# Patient Record
Sex: FEMALE | Race: WHITE | NOT HISPANIC OR LATINO
[De-identification: names, ages, dates, MRNs, and addresses within clinical notes are randomized per-mention and may not be internally consistent; named-entity substitution may affect disease eponyms.]

---

## 2020-09-04 ENCOUNTER — NON-APPOINTMENT (OUTPATIENT)
Age: 60
End: 2020-09-04

## 2020-09-04 ENCOUNTER — APPOINTMENT (OUTPATIENT)
Dept: OPHTHALMOLOGY | Facility: CLINIC | Age: 60
End: 2020-09-04
Payer: COMMERCIAL

## 2020-09-04 PROCEDURE — 92002 INTRM OPH EXAM NEW PATIENT: CPT

## 2020-09-04 PROCEDURE — 92015 DETERMINE REFRACTIVE STATE: CPT

## 2020-09-04 PROCEDURE — 92132 CPTRZD OPH DX IMG ANT SGM: CPT

## 2020-09-21 ENCOUNTER — APPOINTMENT (OUTPATIENT)
Dept: OPHTHALMOLOGY | Facility: CLINIC | Age: 60
End: 2020-09-21
Payer: COMMERCIAL

## 2020-09-21 ENCOUNTER — NON-APPOINTMENT (OUTPATIENT)
Age: 60
End: 2020-09-21

## 2020-09-21 PROCEDURE — 92002 INTRM OPH EXAM NEW PATIENT: CPT

## 2020-09-21 PROCEDURE — 92020 GONIOSCOPY: CPT

## 2020-09-21 PROCEDURE — 76513 OPH US DX ANT SGM US UNI/BI: CPT | Mod: RT

## 2020-11-03 ENCOUNTER — NON-APPOINTMENT (OUTPATIENT)
Age: 60
End: 2020-11-03

## 2020-11-03 ENCOUNTER — APPOINTMENT (OUTPATIENT)
Dept: OPHTHALMOLOGY | Facility: CLINIC | Age: 60
End: 2020-11-03
Payer: COMMERCIAL

## 2020-11-03 PROCEDURE — 99072 ADDL SUPL MATRL&STAF TM PHE: CPT

## 2020-11-03 PROCEDURE — 66761 REVISION OF IRIS: CPT | Mod: RT

## 2020-11-13 ENCOUNTER — APPOINTMENT (OUTPATIENT)
Dept: OPHTHALMOLOGY | Facility: CLINIC | Age: 60
End: 2020-11-13

## 2020-12-03 ENCOUNTER — APPOINTMENT (OUTPATIENT)
Dept: OPHTHALMOLOGY | Facility: CLINIC | Age: 60
End: 2020-12-03

## 2021-05-05 ENCOUNTER — APPOINTMENT (OUTPATIENT)
Dept: ORTHOPEDIC SURGERY | Facility: CLINIC | Age: 61
End: 2021-05-05
Payer: MEDICAID

## 2021-05-05 VITALS — WEIGHT: 129 LBS | BODY MASS INDEX: 20.73 KG/M2 | RESPIRATION RATE: 16 BRPM | HEIGHT: 66 IN

## 2021-05-05 DIAGNOSIS — Z82.62 FAMILY HISTORY OF OSTEOPOROSIS: ICD-10-CM

## 2021-05-05 DIAGNOSIS — Z78.9 OTHER SPECIFIED HEALTH STATUS: ICD-10-CM

## 2021-05-05 DIAGNOSIS — Z86.39 PERSONAL HISTORY OF OTHER ENDOCRINE, NUTRITIONAL AND METABOLIC DISEASE: ICD-10-CM

## 2021-05-05 DIAGNOSIS — Z82.61 FAMILY HISTORY OF ARTHRITIS: ICD-10-CM

## 2021-05-05 PROCEDURE — 99072 ADDL SUPL MATRL&STAF TM PHE: CPT

## 2021-05-05 PROCEDURE — 99204 OFFICE O/P NEW MOD 45 MIN: CPT

## 2021-05-05 PROCEDURE — 73110 X-RAY EXAM OF WRIST: CPT | Mod: LT

## 2021-05-05 RX ORDER — VENLAFAXINE HYDROCHLORIDE 150 MG/1
CAPSULE, EXTENDED RELEASE ORAL
Refills: 0 | Status: ACTIVE | COMMUNITY

## 2021-05-05 RX ORDER — LEVOTHYROXINE SODIUM 137 UG/1
TABLET ORAL
Refills: 0 | Status: ACTIVE | COMMUNITY

## 2021-05-05 RX ORDER — ATORVASTATIN CALCIUM 80 MG/1
TABLET, FILM COATED ORAL
Refills: 0 | Status: ACTIVE | COMMUNITY

## 2021-05-06 ENCOUNTER — APPOINTMENT (OUTPATIENT)
Dept: ORTHOPEDIC SURGERY | Facility: CLINIC | Age: 61
End: 2021-05-06
Payer: MEDICAID

## 2021-05-06 DIAGNOSIS — S69.92XA UNSPECIFIED INJURY OF LEFT WRIST, HAND AND FINGER(S), INITIAL ENCOUNTER: ICD-10-CM

## 2021-05-06 PROCEDURE — 99214 OFFICE O/P EST MOD 30 MIN: CPT

## 2021-05-06 PROCEDURE — 99072 ADDL SUPL MATRL&STAF TM PHE: CPT

## 2021-05-09 ENCOUNTER — TRANSCRIPTION ENCOUNTER (OUTPATIENT)
Age: 61
End: 2021-05-09

## 2021-05-10 ENCOUNTER — APPOINTMENT (OUTPATIENT)
Dept: ORTHOPEDIC SURGERY | Facility: AMBULATORY SURGERY CENTER | Age: 61
End: 2021-05-10

## 2021-05-10 ENCOUNTER — OUTPATIENT (OUTPATIENT)
Dept: OUTPATIENT SERVICES | Facility: HOSPITAL | Age: 61
LOS: 1 days | Discharge: HOME | End: 2021-05-10
Payer: MEDICAID

## 2021-05-10 PROCEDURE — 25290 INCISE WRIST/FOREARM TENDON: CPT | Mod: LT

## 2021-05-10 PROCEDURE — 25607 OPTX DST RD XARTC FX/EPI SEP: CPT | Mod: LT

## 2021-05-10 RX ORDER — HYDROCODONE BITARTRATE AND ACETAMINOPHEN 5; 325 MG/1; MG/1
5-325 TABLET ORAL
Qty: 20 | Refills: 0 | Status: ACTIVE | COMMUNITY
Start: 2021-05-10 | End: 1900-01-01

## 2021-05-19 ENCOUNTER — APPOINTMENT (OUTPATIENT)
Dept: ORTHOPEDIC SURGERY | Facility: CLINIC | Age: 61
End: 2021-05-19
Payer: MEDICAID

## 2021-05-19 PROCEDURE — 99024 POSTOP FOLLOW-UP VISIT: CPT

## 2021-05-19 PROCEDURE — 73110 X-RAY EXAM OF WRIST: CPT | Mod: LT

## 2021-05-25 ENCOUNTER — APPOINTMENT (OUTPATIENT)
Dept: ORTHOPEDIC SURGERY | Facility: CLINIC | Age: 61
End: 2021-05-25

## 2021-06-09 ENCOUNTER — APPOINTMENT (OUTPATIENT)
Dept: ORTHOPEDIC SURGERY | Facility: CLINIC | Age: 61
End: 2021-06-09
Payer: MEDICAID

## 2021-06-09 DIAGNOSIS — S52.502A UNSPECIFIED FRACTURE OF THE LOWER END OF LEFT RADIUS, INITIAL ENCOUNTER FOR CLOSED FRACTURE: ICD-10-CM

## 2021-06-09 PROCEDURE — 73110 X-RAY EXAM OF WRIST: CPT | Mod: LT

## 2021-06-09 PROCEDURE — 99024 POSTOP FOLLOW-UP VISIT: CPT

## 2021-08-25 ENCOUNTER — APPOINTMENT (OUTPATIENT)
Dept: ORTHOPEDIC SURGERY | Facility: CLINIC | Age: 61
End: 2021-08-25

## 2021-09-20 ENCOUNTER — NON-APPOINTMENT (OUTPATIENT)
Age: 61
End: 2021-09-20

## 2021-09-21 ENCOUNTER — APPOINTMENT (OUTPATIENT)
Dept: ORTHOPEDIC SURGERY | Facility: CLINIC | Age: 61
End: 2021-09-21

## 2022-10-19 ENCOUNTER — NON-APPOINTMENT (OUTPATIENT)
Age: 62
End: 2022-10-19

## 2022-10-21 ENCOUNTER — NON-APPOINTMENT (OUTPATIENT)
Age: 62
End: 2022-10-21

## 2022-10-27 ENCOUNTER — APPOINTMENT (OUTPATIENT)
Dept: UROLOGY | Facility: CLINIC | Age: 62
End: 2022-10-27

## 2022-10-27 VITALS
OXYGEN SATURATION: 96 % | DIASTOLIC BLOOD PRESSURE: 87 MMHG | TEMPERATURE: 97.9 F | HEART RATE: 71 BPM | SYSTOLIC BLOOD PRESSURE: 144 MMHG

## 2022-10-27 DIAGNOSIS — Z00.00 ENCOUNTER FOR GENERAL ADULT MEDICAL EXAMINATION W/OUT ABNORMAL FINDINGS: ICD-10-CM

## 2022-10-27 DIAGNOSIS — Z87.440 PERSONAL HISTORY OF URINARY (TRACT) INFECTIONS: ICD-10-CM

## 2022-10-27 PROCEDURE — 99205 OFFICE O/P NEW HI 60 MIN: CPT

## 2022-10-27 NOTE — PHYSICAL EXAM
[General Appearance - Well Developed] : well developed [General Appearance - Well Nourished] : well nourished [Normal Appearance] : normal appearance [Well Groomed] : well groomed [General Appearance - In No Acute Distress] : no acute distress [Edema] : no peripheral edema [Respiration, Rhythm And Depth] : normal respiratory rhythm and effort [Exaggerated Use Of Accessory Muscles For Inspiration] : no accessory muscle use [Abdomen Soft] : soft [Abdomen Tenderness] : non-tender [Abdomen Mass (___ Cm)] : no abdominal mass palpated [Abdomen Hernia] : no hernia was discovered [External Female Genitalia] : normal external genitalia [Normal Station and Gait] : the gait and station were normal for the patient's age [] : no rash [No Focal Deficits] : no focal deficits [Oriented To Time, Place, And Person] : oriented to person, place, and time [Affect] : the affect was normal [Mood] : the mood was normal [Not Anxious] : not anxious [FreeTextEntry1] : chaperone TRISH Francisco present for exam.  Attempted to palpitate urethera, pain with palpitation.  exam stopped

## 2022-10-27 NOTE — HISTORY OF PRESENT ILLNESS
[FreeTextEntry1] : Dr. Sohan Vicente\par [address] \par [address]\par [place], [place] \par \par CC: UTI and Urinary Urgency\par \par This is a 62 year old female who presents today for UTI symptoms\par Reports 2-3 UTIs per year.  When she gets them they are " extremely painful" cant stand, sever lower abdominal pressure.\par No fevers, nausea/vomiting, no dysuria, no \par Nocturia x 3 \par \par Currently on antibiotics- Was on Cephalexin since Saturday.  Finished yesterday \par Constant urgency, frequency, no UUI\par No dysuria\par No gross hematuria \par \par Negative UCx 10/22/22, 10/20/22, , 1/2021, 9/2020, 7/2019\par Last UA on 9/21/21 showed 4 RBC/HPF\par \par 9/2021-10,000-49,000 Enterococcus \par \par Seen by prior urologist 4-5 years ago \par Completed Renal US and cystoscopy\par Was told she had cyst on her kidneys \par \par Some GI problems reports diarrhea and constipation at times\par Urinary symptoms worsen when she has bouts of diarrhea\par \par No vaginal deliveries \par \par Pain with intercourse over the past 4 years \par \par Fluids Intake:\par Snapple Ice Tea\par \par No pads\par NO RAISA, NO UUI \par \par \par \par Surgical Hx: wrist surgery, laparoscopy 26 years ago \par Social Hx: nonsmoker,  \par Family Hx: noncontributory \par \par \par \par

## 2022-10-27 NOTE — ASSESSMENT
[FreeTextEntry1] : Diagnosis: OAB, Pelvic Floor dysfunction, history of UTI \par \par Plan:\par check UA, cytology, consider CTU if hematuria \par \par Negative UCx x 2 on 10/20 and 10/22.  She just finished cephlaxin would not recommend UCx at this time\par Trial of Estrace Cream\par Trial of Myrbetriq for OAB - patient with memory decline and occasional loss for words does not want anticholinergic to due associations with cognitive decline/dementia \par \par Follow up for continued care with Dr. Avina\par \par Raul Gunn MD, FACS, FRCS \par  of Urology [facility]\par Director of Laparoscopic and Robotic Surgery \par [facility] Director of Urology, [facility] \par Professor of Urology\par \par (Office) [phone]\par (Cell)  [phone] \par [email or web address] \par \par

## 2022-10-31 ENCOUNTER — RX RENEWAL (OUTPATIENT)
Age: 62
End: 2022-10-31

## 2022-10-31 LAB
APPEARANCE: CLEAR
BACTERIA UR CULT: NORMAL
BACTERIA: NEGATIVE
BILIRUBIN URINE: NEGATIVE
BLOOD URINE: ABNORMAL
COLOR: NORMAL
GLUCOSE QUALITATIVE U: NEGATIVE
HYALINE CASTS: 0 /LPF
KETONES URINE: NEGATIVE
LEUKOCYTE ESTERASE URINE: NEGATIVE
MICROSCOPIC-UA: NORMAL
NITRITE URINE: NEGATIVE
PH URINE: 6
PROTEIN URINE: NEGATIVE
RED BLOOD CELLS URINE: 6 /HPF
SPECIFIC GRAVITY URINE: 1.01
SQUAMOUS EPITHELIAL CELLS: 0 /HPF
UROBILINOGEN URINE: NORMAL
WHITE BLOOD CELLS URINE: 0 /HPF

## 2022-11-02 LAB — URINE CYTOLOGY: NORMAL

## 2022-11-04 RX ORDER — MIRABEGRON 25 MG/1
25 TABLET, FILM COATED, EXTENDED RELEASE ORAL
Qty: 30 | Refills: 0 | Status: ACTIVE | COMMUNITY
Start: 2022-10-27 | End: 1900-01-01

## 2022-11-30 ENCOUNTER — APPOINTMENT (OUTPATIENT)
Dept: UROLOGY | Facility: CLINIC | Age: 62
End: 2022-11-30

## 2022-11-30 VITALS
DIASTOLIC BLOOD PRESSURE: 93 MMHG | BODY MASS INDEX: 199.5 KG/M2 | OXYGEN SATURATION: 96 % | TEMPERATURE: 97.4 F | SYSTOLIC BLOOD PRESSURE: 159 MMHG | HEART RATE: 86 BPM | WEIGHT: 293 LBS

## 2022-11-30 PROCEDURE — 99215 OFFICE O/P EST HI 40 MIN: CPT

## 2022-11-30 NOTE — ASSESSMENT
[FreeTextEntry1] : \par \par Impression/plan: 59-year-old woman with OAB and microhematuria.\par \par 1. Options for management of the patient's overactive bladder and incontinence discussed at length. These included medical therapy, behavioral modification, bladder retraining, and surgical options. Will try mirabegron 25 mg daily, se discussed, check BP. \par 2. CT Urogram and cystoscopy. \par

## 2022-11-30 NOTE — LETTER BODY
[Dear  ___] : Dear  [unfilled], [Consult Letter:] : I had the pleasure of evaluating your patient, [unfilled]. [Please see my note below.] : Please see my note below. [Consult Closing:] : Thank you very much for allowing me to participate in the care of this patient.  If you have any questions, please do not hesitate to contact me. [Sincerely,] : Sincerely, [FreeTextEntry3] : Starla Avina MD\par System Director [facility]\par Department of Urology\par [facility] \par   at [facility]\par  of Urology\par [facility] at [facility]\par

## 2022-11-30 NOTE — HISTORY OF PRESENT ILLNESS
[FreeTextEntry1] : 62-year-old woman referred by Dr. Gunn for urinary frequency, urinary urgency, and nocturia x2-3.  She had had a history in the past of recurrent urinary tract infections, her most recent urine culture was negative.  Her urinalysis did show 5 red blood cells.  Urine cytology was negative.  When she was seen last approximately month ago she was given a prescription for mirabegron, however she did not take up until now because she wanted my opinion on whether this was to be helpful.  She was also having a history in the past of severe pelvic pressure which is now since resolved.  She denies any other irritative obstructive voiding symptoms.  She denies any prolapse sensation.

## 2022-12-05 RX ORDER — CIPROFLOXACIN HYDROCHLORIDE 500 MG/1
500 TABLET, FILM COATED ORAL
Qty: 6 | Refills: 0 | Status: ACTIVE | COMMUNITY
Start: 2022-12-05 | End: 1900-01-01

## 2022-12-21 LAB — BACTERIA UR CULT: ABNORMAL

## 2023-01-06 ENCOUNTER — APPOINTMENT (OUTPATIENT)
Dept: UROLOGY | Facility: CLINIC | Age: 63
End: 2023-01-06
Payer: MEDICAID

## 2023-01-06 VITALS
OXYGEN SATURATION: 97 % | DIASTOLIC BLOOD PRESSURE: 87 MMHG | TEMPERATURE: 98.3 F | SYSTOLIC BLOOD PRESSURE: 136 MMHG | HEART RATE: 70 BPM

## 2023-01-06 DIAGNOSIS — R31.29 OTHER MICROSCOPIC HEMATURIA: ICD-10-CM

## 2023-01-06 DIAGNOSIS — R91.1 SOLITARY PULMONARY NODULE: ICD-10-CM

## 2023-01-06 LAB
BILIRUB UR QL STRIP: NORMAL
CLARITY UR: CLEAR
COLLECTION METHOD: NORMAL
GLUCOSE UR-MCNC: NORMAL
HCG UR QL: 0.2 EU/DL
HGB UR QL STRIP.AUTO: NORMAL
KETONES UR-MCNC: NORMAL
LEUKOCYTE ESTERASE UR QL STRIP: NORMAL
NITRITE UR QL STRIP: NORMAL
PH UR STRIP: 7
PROT UR STRIP-MCNC: NORMAL
SP GR UR STRIP: 1.02

## 2023-01-06 PROCEDURE — 52000 CYSTOURETHROSCOPY: CPT

## 2023-01-06 PROCEDURE — 81002 URINALYSIS NONAUTO W/O SCOPE: CPT

## 2023-01-06 PROCEDURE — 99213 OFFICE O/P EST LOW 20 MIN: CPT | Mod: 25

## 2023-01-06 RX ORDER — MIRABEGRON 50 MG/1
50 TABLET, FILM COATED, EXTENDED RELEASE ORAL
Qty: 30 | Refills: 6 | Status: ACTIVE | COMMUNITY
Start: 2023-01-06 | End: 1900-01-01

## 2023-01-08 NOTE — ASSESSMENT
[FreeTextEntry1] : \par \par Impression/plan:  59-year-old woman with OAB and microhematuria.\par \par 1. Options for management of the patient's overactive bladder and incontinence discussed at length. These included medical therapy, behavioral modification, bladder retraining, and surgical options. Will try mirabegron 50 mg daily, se discussed, check BP (? lower dose was helpful). \par 2. CT reviewed, pelvic US, CT Chest ordered per rad recs. \par 3. CT results send to PCP Dr. Vicente. \par \par \par \par

## 2023-01-08 NOTE — PHYSICAL EXAM
[General Appearance - Well Developed] : well developed [General Appearance - Well Nourished] : well nourished [Normal Appearance] : normal appearance [Well Groomed] : well groomed [General Appearance - In No Acute Distress] : no acute distress [] : no respiratory distress [Respiration, Rhythm And Depth] : normal respiratory rhythm and effort [Exaggerated Use Of Accessory Muscles For Inspiration] : no accessory muscle use [Normal Station and Gait] : the gait and station were normal for the patient's age

## 2023-01-08 NOTE — LETTER BODY
[Dear  ___] : Dear  [unfilled], [Courtesy Letter:] : I had the pleasure of seeing your patient, [unfilled], in my office today. [Please see my note below.] : Please see my note below. [Consult Closing:] : Thank you very much for allowing me to participate in the care of this patient.  If you have any questions, please do not hesitate to contact me. [Sincerely,] : Sincerely, [FreeTextEntry3] : Starla Avina MD\par System Director [facility]\par Department of Urology\par [facility] \par   at [facility]\par  of Urology\par [facility] at [facility]\par

## 2023-01-08 NOTE — HISTORY OF PRESENT ILLNESS
[FreeTextEntry1] : 62-year-old woman referred by Dr. Gunn for urinary frequency, urinary urgency, and nocturia x2-3. She had had a history in the past of recurrent urinary tract infections, her most recent urine culture was negative. Her urinalysis did show 5 red blood cells. Urine cytology was negative. When she was seen last approximately month ago she was given a prescription for mirabegron, however she did not take up until now because she wanted my opinion on whether this was to be helpful. She was also having a history in the past of severe pelvic pressure which is now since resolved. She denies any other irritative obstructive voiding symptoms. She denies any prolapse sensation. \par \par 1/6/23\par \par The patient is here today for completion of microhematuria w/u. Urine cytology negative. CT does not show any sig gu path, chest nodule, pericardial cyst, ovarian cystic lesion and hepatic cyst noted. \par \par Cysto - unremarkable.

## 2023-01-12 ENCOUNTER — NON-APPOINTMENT (OUTPATIENT)
Age: 63
End: 2023-01-12

## 2023-02-06 ENCOUNTER — NON-APPOINTMENT (OUTPATIENT)
Age: 63
End: 2023-02-06

## 2023-02-08 ENCOUNTER — NON-APPOINTMENT (OUTPATIENT)
Age: 63
End: 2023-02-08

## 2023-02-08 RX ORDER — SULFAMETHOXAZOLE AND TRIMETHOPRIM 800; 160 MG/1; MG/1
800-160 TABLET ORAL
Qty: 6 | Refills: 0 | Status: ACTIVE | COMMUNITY
Start: 2023-02-08 | End: 1900-01-01

## 2023-02-10 ENCOUNTER — NON-APPOINTMENT (OUTPATIENT)
Age: 63
End: 2023-02-10

## 2023-02-10 LAB — BACTERIA UR CULT: NORMAL

## 2023-02-17 ENCOUNTER — LABORATORY RESULT (OUTPATIENT)
Age: 63
End: 2023-02-17

## 2023-02-17 ENCOUNTER — NON-APPOINTMENT (OUTPATIENT)
Age: 63
End: 2023-02-17

## 2023-02-17 ENCOUNTER — APPOINTMENT (OUTPATIENT)
Dept: GYNECOLOGIC ONCOLOGY | Facility: CLINIC | Age: 63
End: 2023-02-17
Payer: MEDICAID

## 2023-02-17 VITALS
HEART RATE: 81 BPM | SYSTOLIC BLOOD PRESSURE: 161 MMHG | BODY MASS INDEX: 22.18 KG/M2 | HEIGHT: 66 IN | TEMPERATURE: 97.2 F | OXYGEN SATURATION: 95 % | DIASTOLIC BLOOD PRESSURE: 96 MMHG | WEIGHT: 138 LBS

## 2023-02-17 PROCEDURE — 99205 OFFICE O/P NEW HI 60 MIN: CPT

## 2023-02-17 NOTE — CHIEF COMPLAINT
[FreeTextEntry1] : 61 y/o w/history of recurrent UTIs follows with Dr. Avina who referred her for management of adnexal cysts. Hx of microscopic hematuria. In November she has pelvic pressure and thought she had a UTI. Went to urgent care and was treated with antibiotics which did not help and urine culture had been negative. Was unable to do TVUS as it was too painful. She has not been sexually active after menopause. Denies dysuria but endorses frequent urination. Reports vaginal itching. Denies vaginal bleeding or abnormal discharge. Trialed myrbetric but did not help. \par \par LMP: menopause in early 50s\par OBHX: D&C\par GYNHX: papsmears have always been normal, last "a couple of years ago" but has not been able to tolerate since , endometriosis\par PMHX: hypothyroidism, HLD \par SX: wrist surgery, dx l/s\par MED: synthroid, statin, Effexor\par ALL: NKDA\par SOCIAL: denies\par FAMHX: denies family history of cancer\par \par Health Maintenance\par Last pap: \par Last mammogram: 2022 wnl\par Last colonoscopy: never\par \par PCP: Dr. Vicente\par

## 2023-02-17 NOTE — ASSESSMENT
[FreeTextEntry1] : Discussed with the patient that her symptoms are unlikely to be from a 3cm ovarian cyst. This is a relatively small cyst, and unless it was specifically tethered to the bladder, it is unlikely that it would cause such severe pressure symptoms. \par \par Ovaries are poorly visualized on CT scan. Patient could not tolerate a TVS. Will request Pelvis MRI and send tumor markers today.\par \par Laparoscopic BSO discussed briefly today. I shared my concerns that this procedure will not improve her symptoms, and unless there are concerning features on MRI or elevated tumor markers, I would recommend against surgery at this time. \par \par All questions answered.\par \par [] Pelvis MRI\par [] , , CEA

## 2023-02-17 NOTE — PHYSICAL EXAM
[Normal] : Bladder: Not distended, no tenderness [de-identified] : narrow introitus, pain with palpation [de-identified] : Unable to perform adequate gyn exam. Patient could not tolerate

## 2023-02-17 NOTE — HISTORY OF PRESENT ILLNESS
[FreeTextEntry1] : Problem List\par 1. Adnexal cyst\par \par Previous Therapy\par 1. CT A/P 12/8/22\par     a) The uterus is normal size. Right adnexal cysts are identified measuring 3.7cm and 3.0cm. \par     b) New 2.3 x 4.0 cm anterior pericardial cyst\par     c) 0.6cm low attenuation lesion right hepatic lobe with mild increased size most likely cyst \par     d) Bilateral renal cysts. No further workup needed.\par     e) New 0.3cm nodule right lower lobe and new 0.4cm nodule right lower lobe consider CT chest

## 2023-02-21 ENCOUNTER — APPOINTMENT (OUTPATIENT)
Dept: GYNECOLOGIC ONCOLOGY | Facility: CLINIC | Age: 63
End: 2023-02-21
Payer: MEDICAID

## 2023-02-21 DIAGNOSIS — N83.299 OTHER OVARIAN CYST, UNSPECIFIED SIDE: ICD-10-CM

## 2023-02-21 LAB
APPEARANCE: CLEAR
BILIRUBIN URINE: NEGATIVE
BLOOD URINE: ABNORMAL
COLOR: NORMAL
CYTOLOGY CVX/VAG DOC THIN PREP: ABNORMAL
GLUCOSE QUALITATIVE U: NEGATIVE
HPV HIGH+LOW RISK DNA PNL CVX: NOT DETECTED
KETONES URINE: NEGATIVE
LEUKOCYTE ESTERASE URINE: NEGATIVE
NITRITE URINE: NEGATIVE
PH URINE: 6.5
PROTEIN URINE: NEGATIVE
SPECIFIC GRAVITY URINE: 1.01
UROBILINOGEN URINE: NORMAL

## 2023-02-21 PROCEDURE — 36415 COLL VENOUS BLD VENIPUNCTURE: CPT

## 2023-02-22 LAB
CANCER AG125 SERPL-ACNC: 10 U/ML
CANCER AG19-9 SERPL-ACNC: 8 U/ML
CEA SERPL-MCNC: 0.7 NG/ML

## 2023-02-28 ENCOUNTER — NON-APPOINTMENT (OUTPATIENT)
Age: 63
End: 2023-02-28

## 2023-03-01 RX ORDER — ESTRADIOL 0.1 MG/G
0.1 CREAM VAGINAL
Qty: 42.5 | Refills: 0 | Status: ACTIVE | COMMUNITY
Start: 2022-10-27 | End: 1900-01-01

## 2023-03-22 ENCOUNTER — NON-APPOINTMENT (OUTPATIENT)
Age: 63
End: 2023-03-22

## 2023-03-31 ENCOUNTER — NON-APPOINTMENT (OUTPATIENT)
Age: 63
End: 2023-03-31

## 2023-06-30 ENCOUNTER — NON-APPOINTMENT (OUTPATIENT)
Age: 63
End: 2023-06-30

## 2023-10-24 ENCOUNTER — NON-APPOINTMENT (OUTPATIENT)
Age: 63
End: 2023-10-24

## 2023-11-08 ENCOUNTER — NON-APPOINTMENT (OUTPATIENT)
Age: 63
End: 2023-11-08

## 2023-11-16 ENCOUNTER — APPOINTMENT (OUTPATIENT)
Dept: UROLOGY | Facility: CLINIC | Age: 63
End: 2023-11-16
Payer: COMMERCIAL

## 2023-11-16 VITALS
DIASTOLIC BLOOD PRESSURE: 80 MMHG | TEMPERATURE: 98 F | HEART RATE: 65 BPM | SYSTOLIC BLOOD PRESSURE: 145 MMHG | OXYGEN SATURATION: 96 %

## 2023-11-16 DIAGNOSIS — R30.0 DYSURIA: ICD-10-CM

## 2023-11-16 PROCEDURE — 99213 OFFICE O/P EST LOW 20 MIN: CPT

## 2023-11-17 PROBLEM — R30.0 DYSURIA: Status: ACTIVE | Noted: 2023-11-17

## 2023-11-21 ENCOUNTER — APPOINTMENT (OUTPATIENT)
Dept: UROLOGY | Facility: CLINIC | Age: 63
End: 2023-11-21

## 2023-11-22 LAB — BACTERIA UR CULT: ABNORMAL

## 2023-11-29 ENCOUNTER — APPOINTMENT (OUTPATIENT)
Dept: UROLOGY | Facility: CLINIC | Age: 63
End: 2023-11-29

## 2023-11-29 ENCOUNTER — APPOINTMENT (OUTPATIENT)
Dept: UROLOGY | Facility: CLINIC | Age: 63
End: 2023-11-29
Payer: COMMERCIAL

## 2023-11-29 VITALS
DIASTOLIC BLOOD PRESSURE: 77 MMHG | SYSTOLIC BLOOD PRESSURE: 140 MMHG | TEMPERATURE: 97.9 F | HEART RATE: 68 BPM | OXYGEN SATURATION: 97 %

## 2023-11-29 PROCEDURE — 99213 OFFICE O/P EST LOW 20 MIN: CPT

## 2024-01-05 RX ORDER — VIBEGRON 75 MG/1
75 TABLET, FILM COATED ORAL
Qty: 30 | Refills: 6 | Status: ACTIVE | COMMUNITY
Start: 2024-01-05 | End: 1900-01-01

## 2024-02-11 ENCOUNTER — NON-APPOINTMENT (OUTPATIENT)
Age: 64
End: 2024-02-11

## 2024-02-15 ENCOUNTER — NON-APPOINTMENT (OUTPATIENT)
Age: 64
End: 2024-02-15

## 2024-03-18 ENCOUNTER — NON-APPOINTMENT (OUTPATIENT)
Age: 64
End: 2024-03-18

## 2024-03-21 ENCOUNTER — NON-APPOINTMENT (OUTPATIENT)
Age: 64
End: 2024-03-21

## 2024-03-26 ENCOUNTER — APPOINTMENT (OUTPATIENT)
Dept: UROLOGY | Facility: CLINIC | Age: 64
End: 2024-03-26
Payer: COMMERCIAL

## 2024-03-26 VITALS
HEART RATE: 89 BPM | OXYGEN SATURATION: 99 % | SYSTOLIC BLOOD PRESSURE: 121 MMHG | TEMPERATURE: 97.3 F | DIASTOLIC BLOOD PRESSURE: 84 MMHG

## 2024-03-26 DIAGNOSIS — R39.15 URGENCY OF URINATION: ICD-10-CM

## 2024-03-26 DIAGNOSIS — N32.81 OVERACTIVE BLADDER: ICD-10-CM

## 2024-03-26 DIAGNOSIS — R35.1 NOCTURIA: ICD-10-CM

## 2024-03-26 PROCEDURE — 99214 OFFICE O/P EST MOD 30 MIN: CPT

## 2024-03-27 LAB — BACTERIA UR CULT: NORMAL

## 2024-03-28 PROBLEM — N32.81 OVERACTIVE BLADDER: Status: ACTIVE | Noted: 2022-11-30

## 2024-03-28 PROBLEM — R39.15 URINARY URGENCY: Status: ACTIVE | Noted: 2022-11-30

## 2024-03-28 PROBLEM — R35.1 NOCTURIA: Status: ACTIVE | Noted: 2022-11-30

## 2024-03-28 NOTE — ASSESSMENT
[FreeTextEntry1] :   Impression/Plan: 63-year-old female with OAB  -Informed patient that her symptoms are consistent with worsening OAB.  -Continue Azo and Advil as needed. Patient made aware that this is not a long-term solution.  -Third line therapies discussed. Patient declined treatment with Botox. Will consider neuromodulation, reviewed the procedures  -Will f/u with UC result -F/u for UDS   I, Dr. Starla Avina, personally performed the evaluation and management (E/M) services for this established patient who presents today with (a) new problem(s)/exacerbation of (an) existing condition(s).  That E/M includes conducting the clinically appropriate interval history &/or exam, assessing all new/exacerbated conditions, and establishing a new plan of care.  Today, my MATT Tuyet, was here to observe &/or participate in the visit & follow plan of care established by me.

## 2024-03-28 NOTE — HISTORY OF PRESENT ILLNESS
[FreeTextEntry1] : 62-year-old woman referred by Dr. Gunn for urinary frequency, urinary urgency, and nocturia x2-3. She had had a history in the past of recurrent urinary tract infections, her most recent urine culture was negative. Her urinalysis did show 5 red blood cells. Urine cytology was negative. When she was seen last approximately month ago she was given a prescription for mirabegron, however she did not take up until now because she wanted my opinion on whether this was to be helpful. She was also having a history in the past of severe pelvic pressure which is now since resolved. She denies any other irritative obstructive voiding symptoms. She denies any prolapse sensation.  1/6/23  The patient is here today for completion of microhematuria w/u. Urine cytology negative. CT does not show any sig gu path, chest nodule, pericardial cyst, ovarian cystic lesion and hepatic cyst noted.  Cysto - unremarkable.  11/16/23  64 yo woman with previous negative w/u for microhematuria (cystoscopy and CT), other no- findings being followed by PCP Dr. Vicente.  Recent E. Coli UTI treated, still symtomatic - mainly freq/urge.  Here tpday to be evaluated for Melanie.  Plan: Pyridium for dysuria. Urine c/s  11/29/23  64 yo woman with previous negative w/u for microhematuria (cystoscopy and CT), other no- findings being followed by PCP Dr. Vicente.  Recent urine c/s negative, but persistent OAB symptoms of freq/urge which are becoming more bothersome. The patient is here today to have urodynamics performed, review results and discuss treatment options. She has changed her mid today, would like something temporary while on her trip and return to have study done.   3/26/24  63-year-old female with Hx Melanie and OAB here for follow-up. She reports having lower abdominal pressure 1 week ago and was also straining to void. She went to  and was prescribed Macrobid for UTI. She was told to take Azo and Ibuprofen for symptom relief which has been helping. Urine culture was negative, and she was told to stop the antibiotics. She returned to  and had a repeat culture 2 days ago, results pending. She also had another culture done yesterday at a [facility] lab, result also pending. She constantly feels the urge to urinate and has nocturia x 4. She has been on Gemtesa but stopped 2 days ago because it was causing constipation and has not been helping her symptoms. Denies hematuria, fever, chills, back pain or incontinence.    [id number]

## 2024-04-26 ENCOUNTER — APPOINTMENT (OUTPATIENT)
Dept: UROLOGY | Facility: CLINIC | Age: 64
End: 2024-04-26

## 2024-07-29 ENCOUNTER — NON-APPOINTMENT (OUTPATIENT)
Age: 64
End: 2024-07-29

## 2024-11-02 ENCOUNTER — NON-APPOINTMENT (OUTPATIENT)
Age: 64
End: 2024-11-02

## 2025-04-22 ENCOUNTER — NON-APPOINTMENT (OUTPATIENT)
Age: 65
End: 2025-04-22